# Patient Record
(demographics unavailable — no encounter records)

---

## 2025-07-28 NOTE — END OF VISIT
[TextEntry] : ILynette, am scribing for and the presence of Dr. Ann-Marie Ford the following sections HISTORY OF PRESENT ILLNESS, PAST MEDICAL/FAMILY/SOCIAL HISTORY; REVIEW OF SYSTEMS; PHYSICAL EXAM; DISPOSITION.